# Patient Record
Sex: FEMALE | Race: WHITE
[De-identification: names, ages, dates, MRNs, and addresses within clinical notes are randomized per-mention and may not be internally consistent; named-entity substitution may affect disease eponyms.]

---

## 2018-03-28 ENCOUNTER — HOSPITAL ENCOUNTER (OUTPATIENT)
Dept: HOSPITAL 45 - X.SURG | Age: 63
Discharge: HOME | End: 2018-03-28
Attending: OBSTETRICS & GYNECOLOGY
Payer: COMMERCIAL

## 2018-03-28 VITALS
BODY MASS INDEX: 40.26 KG/M2 | HEIGHT: 65.98 IN | WEIGHT: 250.53 LBS | HEIGHT: 65.98 IN | WEIGHT: 250.53 LBS | BODY MASS INDEX: 40.26 KG/M2

## 2018-03-28 VITALS — HEART RATE: 65 BPM | DIASTOLIC BLOOD PRESSURE: 88 MMHG | OXYGEN SATURATION: 98 % | SYSTOLIC BLOOD PRESSURE: 169 MMHG

## 2018-03-28 DIAGNOSIS — K58.9: ICD-10-CM

## 2018-03-28 DIAGNOSIS — E66.9: ICD-10-CM

## 2018-03-28 DIAGNOSIS — N84.0: ICD-10-CM

## 2018-03-28 DIAGNOSIS — N92.4: Primary | ICD-10-CM

## 2018-03-28 DIAGNOSIS — Z82.49: ICD-10-CM

## 2018-03-28 DIAGNOSIS — Z88.2: ICD-10-CM

## 2018-03-28 NOTE — [facility] POST OPERATIVE BRIEF NOTE
Immediate Operative Summary


Operative Date


Mar 28, 2018.





Pre-Operative Diagnosis





Abnormal Radha-Menopausal Bleeding





Post-Operative Diagnosis





same as pre op





Procedure(s) Performed





 Hysteroscopy, Polypectomy





Surgeon


Dr Glover





Assistant Surgeon(s)


none





Estimated Blood Loss


1ml





Findings


Consistent with Post-Op Diagnosis





Specimens





polyps





Drains


None





Anesthesia Type


General





Complication(s)


none





Disposition


Accompanied Pt To Recovery:  no


Disposition:  Recovery Room / PACU

## 2018-03-28 NOTE — DISCHARGE INSTRUCTIONS
Discharge Instructions


Date of Service


Mar 28, 2018.





Admission


Reason for Admission:  Abnormal Radha-Menopausal Bleeding





Discharge


Discharge Diagnosis / Problem:  polyp





Discharge Goals


Goal(s):  Routine recovery after surgery





Activity Recommendations


Activity Limitations:  per Instructions/Follow-up section





.





Instructions / Follow-Up


Instructions / Follow-Up


ACTIVITY RECOMMENDATIONS:





*  Avoid tampons, douching, hot tubs, pools, and intercourse until bleeding has 

stopped.


*  May shower as usual.


*  No strenuous activity for 24-48 hours.  After 24-48 hours, you may do 

anything you feel


    like doing (driving and sports are okay).








SPECIAL CARE INSTRUCTIONS:





Special Diet:





*  Mild nausea may occur in the immediate post-operative period.  


*  Take clear liquids such as tea, cola or bouillon until all nausea has 

subsided; you may


    then resume your normal diet.





Special Care:





*  Light bleeding and vaginal spotting can last from a few days to 3-4 weeks.


   Call your doctor if bleeding becomes heavier than the heaviest part of your 

period.


*  Check your temperature twice a day for one week.  If it goes above 100.4 

degrees Fahrenheit


   (38.0 Celsius), notify your doctor.





*  Call your doctor's office for an appointment for 6 weeks after your surgery. 








FOLLOW-UP VISIT:





Call your doctor's office for an appointment for 6 weeks after your surgery.





Current Hospital Diet


Patient's current hospital diet:





Discharge Diet


Recommended Diet:  Regular Diet





Procedures


Procedures Performed:  


 Hysteroscopy, Polypectomy





Pending Studies


Studies pending at discharge:  no





Medical Emergencies








.


Who to Call and When:





Medical Emergencies:  If at any time you feel your situation is an emergency, 

please call 911 immediately.





.





Non-Emergent Contact


Non-Emergency issues call your:  Gynecologist





.


.








"Provider Documentation" section prepared by Jonathan Glover.








.

## 2018-03-28 NOTE — ANESTHESIA PROGRESS NT - [facility]
Anesthesia Post Op Note


Date & Time


Mar 28, 2018 at 12:59





Vital Signs


Pain Intensity:  0





Vital Signs Past 12 Hours








  Date Time  Temp Pulse Resp B/P (MAP) Pulse Ox O2 Delivery O2 Flow Rate FiO2


 


3/28/18 12:47  67 13     


 


3/28/18 12:47  69 13  97   


 


3/28/18 12:45    138/75 (108)    


 


3/28/18 12:42  71 25     


 


3/28/18 12:42  73 25  96   


 


3/28/18 12:41      Room Air  


 


3/28/18 12:40    138/79 (110)    


 


3/28/18 12:37  68 1     


 


3/28/18 12:37  67 1  98   


 


3/28/18 12:35    134/85 (105)    


 


3/28/18 12:32  68 18  98   


 


3/28/18 12:32  69 18     


 


3/28/18 12:30    168/74 (101)    


 


3/28/18 12:27 36.2 72 12 168/74 100 Diffusion Mask 5 


 


3/28/18 11:16 36.7 89 18 168/84 (112) 98 Room Air  











Notes


Mental Status:  alert / awake / arousable, participated in evaluation


Pt Amnestic to Procedure:  Yes


Nausea / Vomiting:  adequately controlled


Pain:  adequately controlled


Airway Patency, RR, SpO2:  stable & adequate


BP & HR:  stable & adequate


Hydration State:  stable & adequate


Anesthetic Complications:  no major complications apparent

## 2018-03-28 NOTE — OPERATIVE REPORT
DATE OF OPERATION:  03/28/2018

 

PREOPERATIVE DIAGNOSIS:  Suspected endometrial polyp.

 

POSTOPERATIVE DIAGNOSIS:  Confirmed endometrial polyp.

 

PROCEDURE:  Hysteroscopy, removal of endometrial polyp with MyoSure.

 

SURGEON:  Dr. Glover.

 

ANESTHETIC:  General.

 

COMPLICATIONS:  None.

 

ESTIMATED BLOOD LOSS:  Minimal.

 

SPECIMENS:  Endometrial polyps.

 

COMPLICATIONS:  None.

 

DRAINS:  None.

 

DISPOSITION:  Recovery room.

 

DESCRIPTION OF PROCEDURE:  Deepika was given general anesthetic, prepped and

draped in dorsal lithotomy position in candy cane stirrups.  Bladder drained.

 Uterus examined and found to be anteverted.  Weighted speculum placed in the

vagina, single tooth tenaculum on the anterior lip of the cervix.  Cervix was

then dilated starting with a #13 dilator progressing to a #25 dilator.  Using

the MyoSure hysteroscope and normal saline as her base solution, I was able

to visualize the cavity.  Both tubal ostia were visualized.  There was no

sign of perforation.  There was a polyp at the fundus on the left side and as

well on the back wall of the uterus.  There were some polypoid shapes as

well.  Using the MyoSure, these polyps were removed, taking care to use the

MyoSure only when in the 's visual field and taking care not to go

deeper than the endometrium.  Once the polyps were removed, pictures were

taken for documentation.  Instruments removed from the cervix and vagina. 

Clamps were removed from the cervix and vagina.  No bleeding.  The patient

sent to the recovery room in stable condition.

 

 

I attest to the content of the Intraoperative Record and any orders documented therein. Any exception
s are noted below.